# Patient Record
Sex: FEMALE | Race: AMERICAN INDIAN OR ALASKA NATIVE
[De-identification: names, ages, dates, MRNs, and addresses within clinical notes are randomized per-mention and may not be internally consistent; named-entity substitution may affect disease eponyms.]

---

## 2020-09-15 ENCOUNTER — HOSPITAL ENCOUNTER (OUTPATIENT)
Dept: HOSPITAL 5 - GIO | Age: 50
Discharge: HOME | End: 2020-09-15
Attending: INTERNAL MEDICINE
Payer: COMMERCIAL

## 2020-09-15 VITALS — DIASTOLIC BLOOD PRESSURE: 77 MMHG | SYSTOLIC BLOOD PRESSURE: 114 MMHG

## 2020-09-15 DIAGNOSIS — Z91.040: ICD-10-CM

## 2020-09-15 DIAGNOSIS — J45.909: ICD-10-CM

## 2020-09-15 DIAGNOSIS — R10.13: ICD-10-CM

## 2020-09-15 DIAGNOSIS — K64.8: ICD-10-CM

## 2020-09-15 DIAGNOSIS — Z86.010: ICD-10-CM

## 2020-09-15 DIAGNOSIS — M19.90: ICD-10-CM

## 2020-09-15 DIAGNOSIS — Z79.899: ICD-10-CM

## 2020-09-15 DIAGNOSIS — K31.7: ICD-10-CM

## 2020-09-15 DIAGNOSIS — K29.70: ICD-10-CM

## 2020-09-15 DIAGNOSIS — Z12.11: Primary | ICD-10-CM

## 2020-09-15 DIAGNOSIS — K31.89: ICD-10-CM

## 2020-09-15 PROCEDURE — 43239 EGD BIOPSY SINGLE/MULTIPLE: CPT

## 2020-09-15 PROCEDURE — 88342 IMHCHEM/IMCYTCHM 1ST ANTB: CPT

## 2020-09-15 PROCEDURE — 45378 DIAGNOSTIC COLONOSCOPY: CPT

## 2020-09-15 PROCEDURE — 88305 TISSUE EXAM BY PATHOLOGIST: CPT

## 2020-09-15 PROCEDURE — 43251 EGD REMOVE LESION SNARE: CPT

## 2020-09-15 NOTE — OPERATIVE REPORT
Operative Report


Operative Report: 





DOS: 9/15/2020





SURGEON: Eren Griffin MD





COLONOSCOPY REPORT





PREOPERATIVE AND POSTOPERATIVE DIAGNOSIS: Personal history of colon polyps





DESCRIPTION OF PROCEDURE: The colonoscope was passed to the terminal ileum as 

identified by the ileal tissue. Scope was carefully withdrawn. Retroflexion was 

performed in the rectum. At the end of procedure, the scope was cleaned using 

normal technique. Vital signs monitored continuously throughout.





SEDATION: Provided by [facility]. 





Quality of the prep was good





COMPLICATIONS: None.





ESTIMATED BLOOD LOSS: 


none





FINDINGS: 


* Normal digital rectal exam


* Evidence of prior ileocecectomy, with end-to-side anastomosis.  Healthy 

  appearance of the anastomosis with no ulceration or sutures or staples visible


* Normal terminal ileum


* Small nonbleeding internal hemorrhoids


* Remainder of exam was unremarkable





RECOMMENDATIONS: 


Repeat surveillance colonoscopy in 5 years

## 2020-09-15 NOTE — ANESTHESIA CONSULTATION
Anesthesia Consult and Med Hx


Date of service: 09/15/20





- Airway


Anesthetic Teeth Evaluation: Good


ROM Head & Neck: Adequate


Mental/Hyoid Distance: Adequate


Mallampati Class: Class I


Intubation Access Assessment: Good





- Pulmonary Exam


CTA: Yes





- Cardiac Exam


Cardiac Exam: RRR





- Pre-Operative Health Status


ASA Pre-Surgery Classification: ASA2


Proposed Anesthetic Plan: MAC





- Pulmonary


Hx Asthma: Yes (no inhlaer use in several months)


Hx Respiratory Symptoms: No





- Cardiovascular System


Hx Hypertension: No


Hx Heart Attack/AMI: No





- Central Nervous System


CVA: No





- Endocrine


Hx Renal Disease: No


Hx Liver Disease: No


Hx Insulin Dependent Diabetes: No


Hx Non-Insulin Dependent Diabetes: No


Hx Thyroid Disease: No





- Other Systems


Hx Obesity: No





- Additional Comments


Anesthesia Medical History Comments: No hx anesthetic complications.

## 2020-09-15 NOTE — OPERATIVE REPORT
Operative Report


Operative Report: 





DOS: 9/15/2020





SURGEON: Eren Griffin MD





EGD with snare polypectomy and biopsy REPORT





PREOPERATIVE DIAGNOSIS and POSTOPERATIVE DIAGNOSIS: dyspepsia





ESTIMATED BLOOD LOSS: minimal





DESCRIPTION OF PROCEDURE: A high-resolution pediatric colonoscope  was passed 

through the oropharynx, esophagus, stomach, and 3rd portion of duodenum. The 

scope was carefully withdrawn. Retroflexion was performed in the stomach. At the

end of the procedure, the scope was cleaned using normal technique. Vital signs 

monitored continuously throughout.





SEDATION: Provided by [facility].





COMPLICATIONS: None.





FINDINGS: 


* No gross lesions in the entire visualized duodenum.  Biopsies were taken to 

  rule out celiac disease.  A total of 6 biopsies were taken, the first from the

  duodenal bulb, with each subsequent biopsies progressively distal with the 

  last biopsy as distal as could be reached with the endoscope


* Mild gastritis in the gastric antrum and body with diffuse erythema.  Biopsies

  were taken to rule out H. Pylori infection.  A total of 5 biopsies were taken,

  2 from the antrum, 1 from the incisura, 2 from the body.


* 4 mm sessile polyp in the gastric antrum.  Removed by cold snare polypectomy.


* Irregular Z line 40 cm from incisors


* Remainder of exam was unremarkable





RECOMMENDATIONS: 


* Follow-up pathology results


* Avoid NSAIDs and acidic foods


* If symptoms do not fully resolve and biopsies are negative add on PPI

## 2021-08-03 ENCOUNTER — HOSPITAL ENCOUNTER (OUTPATIENT)
Dept: HOSPITAL 5 - LAB | Age: 51
Discharge: HOME | End: 2021-08-03
Attending: INTERNAL MEDICINE
Payer: COMMERCIAL

## 2021-08-03 DIAGNOSIS — N39.0: Primary | ICD-10-CM

## 2021-08-03 LAB
BACTERIA #/AREA URNS HPF: (no result) /HPF
BILIRUB UR QL STRIP: (no result)
BLOOD UR QL VISUAL: (no result)
MUCOUS THREADS #/AREA URNS HPF: (no result) /HPF
PH UR STRIP: 5 [PH] (ref 5–7)
PROT UR STRIP-MCNC: (no result) MG/DL
RBC #/AREA URNS HPF: 4 /HPF (ref 0–6)
UROBILINOGEN UR-MCNC: < 2 MG/DL (ref ?–2)
WBC #/AREA URNS HPF: 10 /HPF (ref 0–6)

## 2021-08-03 PROCEDURE — 81001 URINALYSIS AUTO W/SCOPE: CPT

## 2021-08-03 PROCEDURE — 87086 URINE CULTURE/COLONY COUNT: CPT
